# Patient Record
Sex: MALE | Race: WHITE | NOT HISPANIC OR LATINO | Employment: OTHER | URBAN - METROPOLITAN AREA
[De-identification: names, ages, dates, MRNs, and addresses within clinical notes are randomized per-mention and may not be internally consistent; named-entity substitution may affect disease eponyms.]

---

## 2024-08-03 ENCOUNTER — APPOINTMENT (OUTPATIENT)
Dept: GENERAL RADIOLOGY | Facility: CLINIC | Age: 74
End: 2024-08-03
Attending: STUDENT IN AN ORGANIZED HEALTH CARE EDUCATION/TRAINING PROGRAM
Payer: COMMERCIAL

## 2024-08-03 ENCOUNTER — HOSPITAL ENCOUNTER (EMERGENCY)
Facility: CLINIC | Age: 74
Discharge: HOME | End: 2024-08-03
Attending: STUDENT IN AN ORGANIZED HEALTH CARE EDUCATION/TRAINING PROGRAM | Admitting: STUDENT IN AN ORGANIZED HEALTH CARE EDUCATION/TRAINING PROGRAM
Payer: COMMERCIAL

## 2024-08-03 VITALS
RESPIRATION RATE: 18 BRPM | DIASTOLIC BLOOD PRESSURE: 93 MMHG | OXYGEN SATURATION: 100 % | SYSTOLIC BLOOD PRESSURE: 188 MMHG | HEART RATE: 94 BPM | TEMPERATURE: 98 F

## 2024-08-03 DIAGNOSIS — S30.0XXA CONTUSION OF PELVIS, INITIAL ENCOUNTER: ICD-10-CM
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
## 2024-08-03 PROCEDURE — 99283 EMERGENCY DEPT VISIT LOW MDM: CPT

## 2024-08-03 PROCEDURE — 72170 X-RAY EXAM OF PELVIS: CPT

## 2024-08-03 PROCEDURE — 250N000013 HC RX MED GY IP 250 OP 250 PS 637: Performed by: STUDENT IN AN ORGANIZED HEALTH CARE EDUCATION/TRAINING PROGRAM

## 2024-08-03 RX ORDER — IBUPROFEN 600 MG/1
600 TABLET, FILM COATED ORAL ONCE
Status: COMPLETED | OUTPATIENT
Start: 2024-08-03 | End: 2024-08-03

## 2024-08-03 RX ADMIN — IBUPROFEN 600 MG: 600 TABLET, FILM COATED ORAL at 11:33

## 2024-08-03 ASSESSMENT — COLUMBIA-SUICIDE SEVERITY RATING SCALE - C-SSRS
6. HAVE YOU EVER DONE ANYTHING, STARTED TO DO ANYTHING, OR PREPARED TO DO ANYTHING TO END YOUR LIFE?: NO
2. HAVE YOU ACTUALLY HAD ANY THOUGHTS OF KILLING YOURSELF IN THE PAST MONTH?: NO
1. IN THE PAST MONTH, HAVE YOU WISHED YOU WERE DEAD OR WISHED YOU COULD GO TO SLEEP AND NOT WAKE UP?: NO

## 2024-08-03 ASSESSMENT — ACTIVITIES OF DAILY LIVING (ADL): ADLS_ACUITY_SCORE: 35

## 2024-08-03 NOTE — ED NOTES
MusculoskeletalMusculoskeletal WDL: all (pt comes in after falling onto right buttock-hip as he was attempting to get away from a lg.dog that was running after him. pt was ambulatory at the scene. pt very anxious currently. no thinners. pt didnt hit head, no dog bites noted.)Right Joint Tenderness: hip; tenderness (buttock)Extremity Movement: RLERLE Extremity Movement: active ROM mildly impaired

## 2024-08-03 NOTE — ED PROVIDER NOTES
Emergency Department Note      History of Present Illness     Chief Complaint   Fall    HPI   Joel Langley is a 74 year old male with history of hypertension who presents to the ED via EMS for evaluation of a fall. The patient was out for a run this morning when a [place] diaz began chasing after him, he ran out into the road to get away from the dog. He stumbled and fell onto his right buttock in the middle of the road. After he fell he was unable to get up because he was so scared of the dog which came out into the road after him. He is shaky and light headed. He reports 10/10 pain in his buttock. He denies hitting his head or a headache.     Independent Historian   None    Review of External Notes   None    Past Medical History     Medical History and Problem List   Hypertension   Osteoarthritis   Osteoporosis   Prostate cancer   Polymyalgia rheumatic     Medications   Losartan     Surgical History   No surgeries on file.     Physical Exam     Patient Vitals for the past 24 hrs:   BP Temp Pulse Resp SpO2   08/03/24 1115 (!) 188/93 98  F (36.7  C) 94 18 100 %     Physical Exam  General: Anxious, tearful. Cooperative with exam. Normal mentation.   Head:  Scalp is NC/AT  Eyes:  No scleral icterus, PERRL  ENT:  The external nose and ears are normal.   Neck:  Normal range of motion without rigidity.  CV:  Regular rate and rhythm    No pathologic murmur   Resp:  Breath sounds are clear bilaterally    Non-labored, no retractions or accessory muscle use  GI:  Abdomen is soft, no distension, no tenderness. No peritoneal signs  MS:  Full range of motion of bilateral upper and lower extremities without difficulty or pain.  Tenderness to palpation to right ischium.  No pain to palpation through cervical, thoracic, and lumbar spine.    Skin:  Warm and dry, No rash or lesions noted.  Neuro:  Oriented x 3. No gross motor deficits.    Diagnostics     Imaging   XR Pelvis 1/2 Views   Final Result   IMPRESSION: No acute  fracture or malalignment. Mild degenerative changes throughout the pelvis. Moderate degenerative changes of the lower lumbar spine. Chronic ossicles at the anterior inferior iliac spine bilaterally.          Independent Interpretation   X-ray of pelvis shows no acute fracture or bony injury    ED Course      Medications Administered   Medications   ibuprofen (ADVIL/MOTRIN) tablet 600 mg (600 mg Oral $Given 8/3/24 1133)     Procedures   Procedures     Discussion of Management   None    ED Course   ED Course as of 08/03/24 1215   Sat Aug 03, 2024   1113 I obtained history and examined the patient as noted above.      Additional Documentation  None    Medical Decision Making / Diagnosis     CMS Diagnoses: None    MIPS       None    [facility]   Joel Langley is a 74 year old male who presents after mechanical fall.  Patient reports being chased by a dog and falling into street, landing on right side of his buttock.  Denies any other injuries.  Denies hitting his head or loss of consciousness.  He has full range of motion of bilateral upper and lower extremities without difficulty or pain.  No tenderness throughout extremities.  Patient does report buttock pain and does have tenderness to palpation of right ischium.  Is able to ambulate but endorses pain to right buttock.  No pain to entirety of spine with palpation and has full range of motion of back.  Head to toe trauma exam otherwise negative.  X-ray was obtained of the pelvis and is thankfully negative for bony injury today.  Provided patient with ibuprofen and he [place] significant improvement in symptoms.  Patient was significantly anxious and tearful here, offered antianxiety medication which she declined today.  I encouraged him to follow-up with his primary care provider for suspected pelvic contusion, continue ibuprofen and Tylenol along with ice for symptomatic relief and to take it easy over the next few days.  Reasons to return to ER discussed.  Patient is  comfortable with discharge home.    Disposition   The patient was discharged.     Diagnosis     ICD-10-CM    1. Fall, initial encounter  W19.XXXA       2. Contusion of pelvis, initial encounter  S30.0XXA            Discharge Medications   New Prescriptions    No medications on file     I, Cecily Hennessy, am serving as a scribe at 11:13 AM on 8/3/2024 to document services personally performed by Harmony Cheng PA-C based on my observations and the provider's statements to me.        Harmony Cheng PA-C  08/03/24 [id number]

## 2024-08-03 NOTE — ED TRIAGE NOTES
Patient arrives via EMS; patient was out for a morning run and there was a large dog chasing after him. Patient moved into the roadway to get away from the dog and stumbled onto the roadway, hitting his R buttock. Abrasion to R elbow, dog did not get to patient or bite him. Patient was ambulatory on scene and upon arrival. Denies hitting his head, is not on thinners. Patient emotionally distressed about the dog, recanting the event. ABCs intact.     Triage Assessment (Adult)       Row Name 08/03/24 1114          Triage Assessment    Airway WDL WDL        Respiratory WDL    Respiratory WDL X;rhythm/pattern     Rhythm/Pattern, Respiratory tachypneic        Skin Circulation/Temperature WDL    Skin Circulation/Temperature WDL X  abrasion to R elbow        Peripheral/Neurovascular WDL    Peripheral Neurovascular WDL WDL        Cognitive/Neuro/Behavioral WDL    Cognitive/Neuro/Behavioral WDL X;mood/behavior     Mood/Behavior anxious;restless        [place] Coma Scale    Best Eye Response 4-->(E4) spontaneous     Best Motor Response 6-->(M6) obeys commands     Best Verbal Response 5-->(V5) oriented     Adelita Coma Scale Score 15

## 2024-08-03 NOTE — DISCHARGE INSTRUCTIONS
Ibuprofen and Tylenol for ongoing pain relief.  I also recommend applying ice to the right buttock to help with ongoing pain.  Continue to take it easy and get plenty of rest after the injury you sustained today.  Begin gently introducing exercise in the next few days.  Follow-up with primary care provider for reassessment.  If you develop any worsening injuries, return to ER.